# Patient Record
Sex: MALE | Race: WHITE
[De-identification: names, ages, dates, MRNs, and addresses within clinical notes are randomized per-mention and may not be internally consistent; named-entity substitution may affect disease eponyms.]

---

## 2020-04-08 ENCOUNTER — HOSPITAL ENCOUNTER (EMERGENCY)
Dept: HOSPITAL 47 - EC | Age: 85
Discharge: PSYCHIATRIC OR SUBSTANCE USE FACILITY | End: 2020-04-08
Payer: MEDICARE

## 2020-04-08 VITALS — SYSTOLIC BLOOD PRESSURE: 176 MMHG | HEART RATE: 75 BPM | DIASTOLIC BLOOD PRESSURE: 99 MMHG | TEMPERATURE: 97.9 F

## 2020-04-08 VITALS — RESPIRATION RATE: 18 BRPM

## 2020-04-08 DIAGNOSIS — Z79.899: ICD-10-CM

## 2020-04-08 DIAGNOSIS — I10: ICD-10-CM

## 2020-04-08 DIAGNOSIS — H81.10: Primary | ICD-10-CM

## 2020-04-08 DIAGNOSIS — E86.0: ICD-10-CM

## 2020-04-08 DIAGNOSIS — Z87.891: ICD-10-CM

## 2020-04-08 DIAGNOSIS — I44.0: ICD-10-CM

## 2020-04-08 LAB
ALBUMIN SERPL-MCNC: 3.9 G/DL (ref 3.5–5)
ALP SERPL-CCNC: 62 U/L (ref 38–126)
ALT SERPL-CCNC: 22 U/L (ref 4–49)
ANION GAP SERPL CALC-SCNC: 8 MMOL/L
AST SERPL-CCNC: 25 U/L (ref 17–59)
BASOPHILS # BLD AUTO: 0.1 K/UL (ref 0–0.2)
BASOPHILS NFR BLD AUTO: 1 %
BUN SERPL-SCNC: 23 MG/DL (ref 9–20)
CALCIUM SPEC-MCNC: 9.1 MG/DL (ref 8.4–10.2)
CHLORIDE SERPL-SCNC: 105 MMOL/L (ref 98–107)
CK SERPL-CCNC: 48 U/L (ref 55–170)
CO2 SERPL-SCNC: 23 MMOL/L (ref 22–30)
EOSINOPHIL # BLD AUTO: 0.3 K/UL (ref 0–0.7)
EOSINOPHIL NFR BLD AUTO: 3 %
ERYTHROCYTE [DISTWIDTH] IN BLOOD BY AUTOMATED COUNT: 4.4 M/UL (ref 4.3–5.9)
ERYTHROCYTE [DISTWIDTH] IN BLOOD: 13.2 % (ref 11.5–15.5)
GLUCOSE BLD-MCNC: 122 MG/DL (ref 75–99)
GLUCOSE SERPL-MCNC: 125 MG/DL (ref 74–99)
HCT VFR BLD AUTO: 41.3 % (ref 39–53)
HGB BLD-MCNC: 13.5 GM/DL (ref 13–17.5)
LYMPHOCYTES # SPEC AUTO: 2.3 K/UL (ref 1–4.8)
LYMPHOCYTES NFR SPEC AUTO: 24 %
MAGNESIUM SPEC-SCNC: 1.7 MG/DL (ref 1.6–2.3)
MCH RBC QN AUTO: 30.7 PG (ref 25–35)
MCHC RBC AUTO-ENTMCNC: 32.7 G/DL (ref 31–37)
MCV RBC AUTO: 93.9 FL (ref 80–100)
MONOCYTES # BLD AUTO: 0.5 K/UL (ref 0–1)
MONOCYTES NFR BLD AUTO: 5 %
NEUTROPHILS # BLD AUTO: 6.2 K/UL (ref 1.3–7.7)
NEUTROPHILS NFR BLD AUTO: 65 %
PH UR: 5.5 [PH] (ref 5–8)
PLATELET # BLD AUTO: 214 K/UL (ref 150–450)
POTASSIUM SERPL-SCNC: 4.1 MMOL/L (ref 3.5–5.1)
PROT SERPL-MCNC: 7.1 G/DL (ref 6.3–8.2)
SODIUM SERPL-SCNC: 136 MMOL/L (ref 137–145)
SP GR UR: 1.01 (ref 1–1.03)
UROBILINOGEN UR QL STRIP: <2 MG/DL (ref ?–2)
WBC # BLD AUTO: 9.5 K/UL (ref 3.8–10.6)

## 2020-04-08 PROCEDURE — 84484 ASSAY OF TROPONIN QUANT: CPT

## 2020-04-08 PROCEDURE — 70450 CT HEAD/BRAIN W/O DYE: CPT

## 2020-04-08 PROCEDURE — 80053 COMPREHEN METABOLIC PANEL: CPT

## 2020-04-08 PROCEDURE — 83735 ASSAY OF MAGNESIUM: CPT

## 2020-04-08 PROCEDURE — 36415 COLL VENOUS BLD VENIPUNCTURE: CPT

## 2020-04-08 PROCEDURE — 82550 ASSAY OF CK (CPK): CPT

## 2020-04-08 PROCEDURE — 93005 ELECTROCARDIOGRAM TRACING: CPT

## 2020-04-08 PROCEDURE — 70496 CT ANGIOGRAPHY HEAD: CPT

## 2020-04-08 PROCEDURE — 96374 THER/PROPH/DIAG INJ IV PUSH: CPT

## 2020-04-08 PROCEDURE — 71045 X-RAY EXAM CHEST 1 VIEW: CPT

## 2020-04-08 PROCEDURE — 70498 CT ANGIOGRAPHY NECK: CPT

## 2020-04-08 PROCEDURE — 85025 COMPLETE CBC W/AUTO DIFF WBC: CPT

## 2020-04-08 PROCEDURE — 99285 EMERGENCY DEPT VISIT HI MDM: CPT

## 2020-04-08 PROCEDURE — 96361 HYDRATE IV INFUSION ADD-ON: CPT

## 2020-04-08 PROCEDURE — 81003 URINALYSIS AUTO W/O SCOPE: CPT

## 2020-04-08 NOTE — ED
Dizziness HPI





- General


Chief Complaint: Dizziness


Stated Complaint: Dizziness


Time Seen by Provider: 04/08/20 08:25


Source: patient, EMS, RN notes reviewed


Mode of arrival: EMS


Limitations: no limitations





- History of Present Illness


Initial Comments: 





This is a 85-year-old male with a prior history of vertigo and 12 years ago who 

states he had an episode yesterday where he got up and had dizziness he tended 

to walk off to the right he says.  He did not actually fall no headache again 

this morning he had dizziness with getting out of bed and getting into an 

upright position.  No headache no blurry vision no focal weakness no fevers 

chills nausea vomiting sweats or other symptoms.  He states he is asymptomatic 

at this time currently he states he does not get dizzy with head turning her 

head movement.  He was brought in by EMS.  He did demonstrate a first-degree AV 

block per paramedics.


MD Complaint: dizziness





- Related Data


                                Home Medications











 Medication  Instructions  Recorded  Confirmed


 


Ascorbic Acid [Vitamin C] 500 mg PO DAILY 04/08/20 04/08/20


 


Atorvastatin [Lipitor] 20 mg PO HS 04/08/20 04/08/20


 


Benazepril [Lotensin] 5 mg PO BID 04/08/20 04/08/20


 


Cyanocobalamin (Vitamin B-12) 1,000 mcg PO DAILY 04/08/20 04/08/20





[Vitamin B-12]   


 


Folic Acid 0.4 mg PO DAILY 04/08/20 04/08/20


 


Hydrochlorothiazide [Hydrodiuril] 25 mg PO DAILY 04/08/20 04/08/20


 


Latanoprost [Xalatan 0.005%] 1 drop BOTH EYES HS 04/08/20 04/08/20


 


Omega-3 Fatty Acids [Omega-3] 1,000 mg PO DAILY 04/08/20 04/08/20


 


Omeprazole 20 mg PO HS 04/08/20 04/08/20


 


Pyridoxine [Vitamin B-6] 50 mg PO DAILY 04/08/20 04/08/20


 


Timolol 0.5% Ophth Soln [Timoptic 1 drop BOTH EYES BID 04/08/20 04/08/20





0.5% Ophth Soln]   








                                  Previous Rx's











 Medication  Instructions  Recorded


 


Meclizine [Antivert] 25 mg PO TID #20 tab 04/08/20











                                    Allergies











Allergy/AdvReac Type Severity Reaction Status Date / Time


 


No Known Allergies Allergy   Verified 04/08/20 09:48














Review of Systems


ROS Statement: 


Those systems with pertinent positive or pertinent negative responses have been 

documented in the HPI.





ROS Other: All systems not noted in ROS Statement are negative.





Past Medical History


Past Medical History: Hypertension


Past Surgical History: Back Surgery


Additional Past Surgical History / Comment(s): in 2014


Smoking Status: Former smoker


Past Alcohol Use History: None Reported


Past Drug Use History: None Reported





General Exam





- General Exam Comments


Initial Comments: 





This is a well-developed well-nourished awake alert oriented 3 male


Limitations: no limitations


General appearance: alert, in no apparent distress


Head exam: Present: atraumatic, normocephalic, normal inspection


Eye exam: Present: normal appearance, PERRL, EOMI.  Absent: scleral icterus, 

conjunctival injection, periorbital swelling


ENT exam: Present: mucous membranes dry


Neck exam: Present: normal inspection, full ROM, other (No stridor JVD or 

bruits).  Absent: tenderness, meningismus, lymphadenopathy


Respiratory exam: Present: normal lung sounds bilaterally.  Absent: respiratory 

distress, wheezes, rales, rhonchi, stridor


Cardiovascular Exam: Present: regular rate, normal rhythm, normal heart sounds. 

 Absent: systolic murmur, diastolic murmur, rubs, gallop, clicks


GI/Abdominal exam: Present: soft, normal bowel sounds.  Absent: distended, 

tenderness, guarding, rebound, rigid


Extremities exam: Present: normal inspection, full ROM, normal capillary refill.

  Absent: tenderness, pedal edema, joint swelling, calf tenderness


Back exam: Present: normal inspection


Neurological exam: Present: alert, oriented X3, CN II-XII intact.  Absent: motor

 sensory deficit


Psychiatric exam: Present: normal affect, normal mood


Skin exam: Present: warm, dry, intact, normal color.  Absent: rash





Course


                                   Vital Signs











  04/08/20 04/08/20 04/08/20





  08:34 08:35 08:38


 


Temperature 98.3 F  


 


Pulse Rate 59 L  


 


Pulse Rate [   





Left Sitting]   


 


Pulse Rate [   





Left Standing]   


 


Pulse Rate [   





Left Supine]   


 


Respiratory 16  18





Rate   


 


Blood Pressure 191/92  


 


Blood Pressure   





[Left Arm   





Sitting]   


 


Blood Pressure   





[Left Arm   





Standing]   


 


Blood Pressure   





[Left Arm   





Supine]   


 


O2 Sat by Pulse 94 L 96 





Oximetry   














  04/08/20 04/08/20 04/08/20





  09:00 09:30 09:38


 


Temperature   


 


Pulse Rate   


 


Pulse Rate [  60 





Left Sitting]   


 


Pulse Rate [  61 





Left Standing]   


 


Pulse Rate [  59 L 





Left Supine]   


 


Respiratory  16 18





Rate   


 


Blood Pressure 191/92 164/95 188/89


 


Blood Pressure  190/105 





[Left Arm   





Sitting]   


 


Blood Pressure  164/95 





[Left Arm   





Standing]   


 


Blood Pressure  190/94 





[Left Arm   





Supine]   


 


O2 Sat by Pulse 98  





Oximetry   














  04/08/20 04/08/20 04/08/20





  10:00 10:30 11:00


 


Temperature   


 


Pulse Rate 68  59 L


 


Pulse Rate [   





Left Sitting]   


 


Pulse Rate [   





Left Standing]   


 


Pulse Rate [   





Left Supine]   


 


Respiratory 18  18





Rate   


 


Blood Pressure 176/90 188/89 189/97


 


Blood Pressure   





[Left Arm   





Sitting]   


 


Blood Pressure   





[Left Arm   





Standing]   


 


Blood Pressure   





[Left Arm   





Supine]   


 


O2 Sat by Pulse 99 96 96





Oximetry   














  04/08/20 04/08/20 04/08/20





  11:30 11:43 12:00


 


Temperature   


 


Pulse Rate  59 L 63


 


Pulse Rate [   





Left Sitting]   


 


Pulse Rate [   





Left Standing]   


 


Pulse Rate [   





Left Supine]   


 


Respiratory  18 18





Rate   


 


Blood Pressure 205/104 153/87 153/87


 


Blood Pressure   





[Left Arm   





Sitting]   


 


Blood Pressure   





[Left Arm   





Standing]   


 


Blood Pressure   





[Left Arm   





Supine]   


 


O2 Sat by Pulse  99 99





Oximetry   














  04/08/20 04/08/20 04/08/20





  12:30 13:00 13:26


 


Temperature   97.9 F


 


Pulse Rate   75


 


Pulse Rate [   





Left Sitting]   


 


Pulse Rate [   





Left Standing]   


 


Pulse Rate [   





Left Supine]   


 


Respiratory   18





Rate   


 


Blood Pressure 161/85 167/96 176/99


 


Blood Pressure   





[Left Arm   





Sitting]   


 


Blood Pressure   





[Left Arm   





Standing]   


 


Blood Pressure   





[Left Arm   





Supine]   


 


O2 Sat by Pulse 97  97





Oximetry   














- Reevaluation(s)


Reevaluation #1: 





04/08/20 11:25


I did reevaluate patient several occasions he still has some difficulty with 

ambulation some dizziness so to this improved the.  He is concerned because his 

blood pressure numbers have an elevated he did not take his medications this mor

[place] however.  In light of the blood pressure as well as the symptoms CAT scan 

has been ordered.  Antivertalso





EKG Findings





- EKG Results:


EKG: interpreted by LA, sinus rhythm (Sinus rhythm with first-degree AV block 

rate was 60 TN interval 312 QRS duration 94 QT since /436 evidence a left

anterior fascicular block minimal also criteria for LVH evidence of undetermined

age septal infarct nonspecific inferior changes)





Medical Decision Making





- Medical Decision Making





Patient did have persistent dizziness which did seem to improve after IV 

hydration and oral Antivert.  He still felt a little bit off balance however.  

Additionally his blood pressure would fluctuate likely initially from 

dehydration was ultimately likely secondary to some anxiety component.  The lety dennis was seen by Dr. Cantu in the emergency department he will be discharged 

with a prescription for walker as well as Antivert he is a follow-up with Dr. Ramachandran in the office as indicated by Dr. Tran.





- Lab Data


Result diagrams: 


                                 04/08/20 08:45





                                 04/08/20 08:45


                                   Lab Results











  04/08/20 04/08/20 04/08/20 Range/Units





  08:44 08:45 08:45 


 


WBC   9.5   (3.8-10.6)  k/uL


 


RBC   4.40   (4.30-5.90)  m/uL


 


Hgb   13.5   (13.0-17.5)  gm/dL


 


Hct   41.3   (39.0-53.0)  %


 


MCV   93.9   (80.0-100.0)  fL


 


MCH   30.7   (25.0-35.0)  pg


 


MCHC   32.7   (31.0-37.0)  g/dL


 


RDW   13.2   (11.5-15.5)  %


 


Plt Count   214   (150-450)  k/uL


 


Neutrophils %   65   %


 


Lymphocytes %   24   %


 


Monocytes %   5   %


 


Eosinophils %   3   %


 


Basophils %   1   %


 


Neutrophils #   6.2   (1.3-7.7)  k/uL


 


Lymphocytes #   2.3   (1.0-4.8)  k/uL


 


Monocytes #   0.5   (0-1.0)  k/uL


 


Eosinophils #   0.3   (0-0.7)  k/uL


 


Basophils #   0.1   (0-0.2)  k/uL


 


Sodium    136 L  (137-145)  mmol/L


 


Potassium    4.1  (3.5-5.1)  mmol/L


 


Chloride    105  ()  mmol/L


 


Carbon Dioxide    23  (22-30)  mmol/L


 


Anion Gap    8  mmol/L


 


BUN    23 H  (9-20)  mg/dL


 


Creatinine    1.30 H  (0.66-1.25)  mg/dL


 


Est GFR (CKD-EPI)AfAm    58  (>60 ml/min/1.73 sqM)  


 


Est GFR (CKD-EPI)NonAf    50  (>60 ml/min/1.73 sqM)  


 


Glucose    125 H  (74-99)  mg/dL


 


POC Glucose (mg/dL)  122 H    (75-99)  mg/dL


 


POC Glu Operater ID  Cyn Valdez    


 


Calcium    9.1  (8.4-10.2)  mg/dL


 


Magnesium    1.7  (1.6-2.3)  mg/dL


 


Total Bilirubin    0.7  (0.2-1.3)  mg/dL


 


AST    25  (17-59)  U/L


 


ALT    22  (4-49)  U/L


 


Alkaline Phosphatase    62  ()  U/L


 


Creatine Kinase    48 L  ()  U/L


 


Troponin I     (0.000-0.034)  ng/mL


 


Total Protein    7.1  (6.3-8.2)  g/dL


 


Albumin    3.9  (3.5-5.0)  g/dL


 


Urine Color     


 


Urine Appearance     (Clear)  


 


Urine pH     (5.0-8.0)  


 


Ur Specific Gravity     (1.001-1.035)  


 


Urine Protein     (Negative)  


 


Urine Glucose (UA)     (Negative)  


 


Urine Ketones     (Negative)  


 


Urine Blood     (Negative)  


 


Urine Nitrite     (Negative)  


 


Urine Bilirubin     (Negative)  


 


Urine Urobilinogen     (<2.0)  mg/dL


 


Ur Leukocyte Esterase     (Negative)  














  04/08/20 04/08/20 Range/Units





  08:45 09:23 


 


WBC    (3.8-10.6)  k/uL


 


RBC    (4.30-5.90)  m/uL


 


Hgb    (13.0-17.5)  gm/dL


 


Hct    (39.0-53.0)  %


 


MCV    (80.0-100.0)  fL


 


MCH    (25.0-35.0)  pg


 


MCHC    (31.0-37.0)  g/dL


 


RDW    (11.5-15.5)  %


 


Plt Count    (150-450)  k/uL


 


Neutrophils %    %


 


Lymphocytes %    %


 


Monocytes %    %


 


Eosinophils %    %


 


Basophils %    %


 


Neutrophils #    (1.3-7.7)  k/uL


 


Lymphocytes #    (1.0-4.8)  k/uL


 


Monocytes #    (0-1.0)  k/uL


 


Eosinophils #    (0-0.7)  k/uL


 


Basophils #    (0-0.2)  k/uL


 


Sodium    (137-145)  mmol/L


 


Potassium    (3.5-5.1)  mmol/L


 


Chloride    ()  mmol/L


 


Carbon Dioxide    (22-30)  mmol/L


 


Anion Gap    mmol/L


 


BUN    (9-20)  mg/dL


 


Creatinine    (0.66-1.25)  mg/dL


 


Est GFR (CKD-EPI)AfAm    (>60 ml/min/1.73 sqM)  


 


Est GFR (CKD-EPI)NonAf    (>60 ml/min/1.73 sqM)  


 


Glucose    (74-99)  mg/dL


 


POC Glucose (mg/dL)    (75-99)  mg/dL


 


POC Glu Operater ID    


 


Calcium    (8.4-10.2)  mg/dL


 


Magnesium    (1.6-2.3)  mg/dL


 


Total Bilirubin    (0.2-1.3)  mg/dL


 


AST    (17-59)  U/L


 


ALT    (4-49)  U/L


 


Alkaline Phosphatase    ()  U/L


 


Creatine Kinase    ()  U/L


 


Troponin I  <0.012   (0.000-0.034)  ng/mL


 


Total Protein    (6.3-8.2)  g/dL


 


Albumin    (3.5-5.0)  g/dL


 


Urine Color   Light Yellow  


 


Urine Appearance   Clear  (Clear)  


 


Urine pH   5.5  (5.0-8.0)  


 


Ur Specific Gravity   1.009  (1.001-1.035)  


 


Urine Protein   Negative  (Negative)  


 


Urine Glucose (UA)   Negative  (Negative)  


 


Urine Ketones   Negative  (Negative)  


 


Urine Blood   Negative  (Negative)  


 


Urine Nitrite   Negative  (Negative)  


 


Urine Bilirubin   Negative  (Negative)  


 


Urine Urobilinogen   <2.0  (<2.0)  mg/dL


 


Ur Leukocyte Esterase   Negative  (Negative)  














- Radiology Data


Radiology results: report reviewed (I did review the imaging and report no acute

findings.), image reviewed





Disposition


Clinical Impression: 


 Benign paroxysmal positional vertigo, Hypertension, Dehydration





Disposition: TRANSFER TO PSYCH HOSP/UNIT


Condition: Good


Instructions (If sedation given, give patient instructions):  Dizziness (ED), 

Benign Paroxysmal Positional Vertigo (ED)


Additional Instructions: 


Medication prescription since you are preferred pharmacy


Prescriptions: 


Meclizine [Antivert] 25 mg PO TID #20 tab


Is patient prescribed a controlled substance at d/c from ED?: No


Referrals: 


Bruno Ramachandran MD [Primary Care Provider] - 1-2 days

## 2020-04-08 NOTE — CT
EXAMINATION TYPE: CT angio head neck

 

DATE OF EXAM: 4/8/2020

 

HISTORY: dizziness

 

COMPARISON:

 

CT DLP: 533.1 mGycm.  Automated Exposure Control for Dose Reduction was Utilized.

 

TECHNIQUE:  CTA scan of the neck is performed with IV Contrast, patient injected with 65 mL of Isovue
 370, axial images are obtained, coronal and sagittal reformatted images are reviewed. Three-D recons
tructed images are created on an independent workstation and reviewed.  Source images are reviewed.

 

FINDINGS:

 

Carotid/Vascular Structures: There is a three-vessel arch. Common carotid arteries bifurcate normally
 into internal and external carotid arteries. No focal stenosis is evident. Some atheromatous plaquin
g calcifications at the carotid bifurcation. Internal carotid arteries are patent to the skull base. 
Vertebral arteries are codominant.

 

Cervical of [place]: Vertebral basilar system appears normal. Posterior cerebral vasculature is unrema
rkable. Internal carotid arteries bifurcate normally into A1 and M1 segments. A2 segments are normal.
 The anterior communicating artery is patent. Left Posterior communicating artery is absent. Right po
sterior communicating artery is small but patent.

 

Other: No additional significant findings.

 

IMPRESSION:

1. No flow-limiting stenosis bilateral carotid bifurcations.

2. Normal [place] of [place]

## 2020-04-08 NOTE — XR
EXAMINATION TYPE: XR chest 1V portable

 

DATE OF EXAM: 4/8/2020

 

HISTORY: Shortness of breath.

 

COMPARISON: None.

 

TECHNIQUE: Single view of the chest is submitted.

 

FINDINGS:

Demonstrated are scattered senescent parenchymal change.  

 

There is no evidence for focal infiltrate. 

 

The heart is stable.

 

Hilar and mediastinal structures are within normal limits.  

 

Degenerative changes are seen of the dorsal spine. 

 

 IMPRESSION: 

 

1.  Chronic changes without evidence for acute pulmonary disease.

## 2020-04-08 NOTE — CT
EXAMINATION TYPE: CT brain wo con

 

DATE OF EXAM: 4/8/2020

 

COMPARISON: None

 

HISTORY: dizziness

 

CT DLP: 1085 mGycm

 

Unenhanced CT of the brain was performed.  

 

The ventricles, basal cisterns and sulci overlying the cerebral convexities demonstrate mild enlargem
ent. 

 

There is no evidence for intracranial hemorrhage or sulcal effacement.  

 

There is decreased attenuation about the periventricular white matter and deep white matter of both c
erebral hemispheres, compatible with chronic small vessel ischemia. Differential diagnosis does inclu
de demyelination. 

 

No mass effects are seen.No midline shift.

 

Osseous calvarium is intact.  

 

If symptoms persist consider MRI.  

 

IMPRESSION:

 

1. Age related atrophic and chronic small vessel ischemic change without acute intracranial process s
een at this time.